# Patient Record
Sex: MALE | Race: WHITE | NOT HISPANIC OR LATINO | Employment: STUDENT | URBAN - METROPOLITAN AREA
[De-identification: names, ages, dates, MRNs, and addresses within clinical notes are randomized per-mention and may not be internally consistent; named-entity substitution may affect disease eponyms.]

---

## 2022-01-28 ENCOUNTER — OFFICE VISIT (OUTPATIENT)
Dept: URGENT CARE | Age: 22
End: 2022-01-28
Payer: COMMERCIAL

## 2022-01-28 ENCOUNTER — APPOINTMENT (OUTPATIENT)
Dept: RADIOLOGY | Age: 22
End: 2022-01-28
Payer: COMMERCIAL

## 2022-01-28 VITALS
DIASTOLIC BLOOD PRESSURE: 72 MMHG | SYSTOLIC BLOOD PRESSURE: 132 MMHG | HEART RATE: 74 BPM | WEIGHT: 152 LBS | TEMPERATURE: 97.9 F | HEIGHT: 68 IN | RESPIRATION RATE: 18 BRPM | BODY MASS INDEX: 23.04 KG/M2

## 2022-01-28 DIAGNOSIS — M79.672 LEFT FOOT PAIN: Primary | ICD-10-CM

## 2022-01-28 DIAGNOSIS — M79.672 LEFT FOOT PAIN: ICD-10-CM

## 2022-01-28 PROCEDURE — 99213 OFFICE O/P EST LOW 20 MIN: CPT | Performed by: PHYSICIAN ASSISTANT

## 2022-01-28 PROCEDURE — 73630 X-RAY EXAM OF FOOT: CPT

## 2022-01-28 RX ORDER — FINASTERIDE 1 MG/1
1 TABLET, FILM COATED ORAL DAILY
COMMUNITY
Start: 2021-10-31

## 2022-01-28 NOTE — PATIENT INSTRUCTIONS
RICE- rest, ice, compression, elevation  Walking boot and crutches/nonweightbearing for comfort  Call tomorrow for official xray results  Call Ortho today and follow-up with them in the next 1-2 days for further evaluation and treatment     Call [facility] to schedule an appointment: [phone]  Or the direct Ortho number at [phone] to schedule an appointment   Go to the ER immediately if any numbness, tingling, weakness, change in intensity or location of pain, calf pain or swelling, other new or concerning symptoms

## 2022-01-28 NOTE — PROGRESS NOTES
[facility]        NAME: Cong Fischer is a 22 y o  male  : 2000    MRN: [id number]  DATE: 2022  TIME: 5:11 PM    Assessment and Plan   Left foot pain [M79 [id number]]  1  Left foot pain  XR foot 3+ vw left    Ambulatory Referral to Orthopedic Surgery     Xray- negative for obvious acute osseous abnormality, pending final read  Patient states he already has crutches  walking boot- placed by medical staff for comfort, NV intact post-splinting    Patient Instructions     RICE- rest, ice, compression, elevation  Walking boot and crutches/nonweightbearing for comfort  Call tomorrow for official xray results  Call Ortho today and follow-up with them in the next 1-2 days for further evaluation and treatment  Call [facility] Wilson to schedule an appointment: [phone]  Or the direct Ortho number at [phone] to schedule an appointment   Go to the ER immediately if any numbness, tingling, weakness, change in intensity or location of pain, calf pain or swelling, other new or concerning symptoms    Chief Complaint     Chief Complaint   Patient presents with    Foot Pain     c/o left heel pain  was skiing and went off a jump landing hard on heel  History of Present Illness       71-year-old male presents for evaluation of left foot pain after injury that occurred about 2 weeks ago  Patient states he was skiing and he thinks he landed wrong on his left foot/heel  States the pain is worse with walking for a long period of time, resolves at rest   States he has been trying ice and over-the-counter pain medications if needed  Has also been using crutches intermittently with some improvement  He denies any swelling or bruising to the area  Denies any calf, shin or ankle pain or swelling  He denies any redness, warmth, radiation of pain, numbness, tingling, weakness or other complaints        Review of Systems   Review of Systems   Constitutional: Negative for fatigue and fever    Respiratory: Negative for shortness of breath  Cardiovascular: Negative for chest pain and leg swelling  Gastrointestinal: Negative for abdominal pain, nausea and vomiting  Musculoskeletal: Positive for arthralgias and joint swelling  Neurological: Negative for weakness, numbness and headaches  All other systems reviewed and are negative  Current Medications       Current Outpatient Medications:     finasteride (PROPECIA) 1 MG tablet, Take 1 mg by mouth daily, Disp: , Rfl:     Current Allergies     Allergies as of 01/28/2022    (No Known Allergies)            The following portions of the patient's history were reviewed and updated as appropriate: allergies, current medications, past family history, past medical history, past social history, past surgical history and problem list      History reviewed  No pertinent past medical history  No past surgical history on file  No family history on file  Medications have been verified  Objective   /72   Pulse 74   Temp 97 9 °F (36 6 °C) (Temporal)   Resp 18   Ht 5' 8" (1 727 m)   Wt 68 9 kg (152 lb)   BMI 23 11 kg/m²        Physical Exam     Physical Exam  Vitals and nursing note reviewed  Constitutional:       General: He is not in acute distress  Appearance: He is well-developed  He is not ill-appearing  Cardiovascular:      Rate and Rhythm: Normal rate and regular rhythm  Heart sounds: Normal heart sounds  Pulmonary:      Effort: Pulmonary effort is normal       Breath sounds: Normal breath sounds  No wheezing  Musculoskeletal:      Cervical back: Normal range of motion and neck supple  Left lower leg: Normal       Left ankle: Normal  No swelling, ecchymosis or lacerations  No tenderness  Normal range of motion  Normal pulse  Left Achilles Tendon: Normal  No tenderness or defects  Hernandez's test negative  Left foot: Normal range of motion and normal capillary refill   Tenderness present  No swelling, deformity or laceration  Normal pulse  Feet:    Skin:     Capillary Refill: Capillary refill takes less than 2 seconds  Neurological:      Mental Status: He is alert and oriented to person, place, and time  Deep Tendon Reflexes: Reflexes are normal and symmetric  Comments: NV intact with sensation and strong peripheral pulses   5/5 strength of LE bilaterally   Psychiatric:         Behavior: Behavior normal